# Patient Record
Sex: FEMALE | Race: ASIAN
[De-identification: names, ages, dates, MRNs, and addresses within clinical notes are randomized per-mention and may not be internally consistent; named-entity substitution may affect disease eponyms.]

---

## 2017-03-26 ENCOUNTER — HOSPITAL ENCOUNTER (EMERGENCY)
Dept: HOSPITAL 25 - UCEAST | Age: 22
Discharge: HOME | End: 2017-03-26
Payer: COMMERCIAL

## 2017-03-26 VITALS — DIASTOLIC BLOOD PRESSURE: 65 MMHG | SYSTOLIC BLOOD PRESSURE: 113 MMHG

## 2017-03-26 DIAGNOSIS — N39.0: Primary | ICD-10-CM

## 2017-03-26 PROCEDURE — 87086 URINE CULTURE/COLONY COUNT: CPT

## 2017-03-26 PROCEDURE — 87186 SC STD MICRODIL/AGAR DIL: CPT

## 2017-03-26 PROCEDURE — 84702 CHORIONIC GONADOTROPIN TEST: CPT

## 2017-03-26 PROCEDURE — 87077 CULTURE AEROBIC IDENTIFY: CPT

## 2017-03-26 PROCEDURE — G0463 HOSPITAL OUTPT CLINIC VISIT: HCPCS

## 2017-03-26 PROCEDURE — 99202 OFFICE O/P NEW SF 15 MIN: CPT

## 2017-03-26 PROCEDURE — 81003 URINALYSIS AUTO W/O SCOPE: CPT

## 2017-03-26 NOTE — UC
Complaint Female HPI





- HPI Summary


HPI Summary: 





FREQUENCY AND BURNING WITH URINATION SINCE THIS MORNING. NO BACK PAIN. NO 

FEVER. NO ABDOMINAL PAIN





- History Of Current Complaint


Chief Complaint: UCGU


Stated Complaint: POSS UTI


Time Seen by Provider: 03/26/17 15:58


Hx Obtained From: Patient


Hx Last Menstrual Period: 3/19/17


Onset/Duration: Sudden Onset, Lasting Hours, Still Present


Timing: Lasting Hours


Severity Initially: Moderate


Severity Currently: Moderate


Character: Burning


Aggravating Factor(s): Urination


Alleviating Factor(s): Position


Associated Signs And Symptoms: Negative: Fever, Back Pain, Vaginal Bleeding/

Discharge, Vaginal Discharge, Nausea, Vomiting(# Of Episodes =)





- Risk Factors


Ectopic Pregnancy Risk Factor: Negative


Ovarian Torsion Risk Factor: Negative





- Allergies/Home Medications


Allergies/Adverse Reactions: 


 Allergies











Allergy/AdvReac Type Severity Reaction Status Date / Time


 


No Known Allergies Allergy   Verified 03/17/14 10:57














PMH/Surg Hx/FS Hx/Imm Hx


Previously Healthy: Yes





- Surgical History


Surgical History: None





- Family History


Known Family History: 


   Negative: Renal Disease





- Social History


Occupation: Student


Lives: With Family


Alcohol Use: Occasionally


Substance Use Type: None


Smoking Status (MU): Never Smoked Tobacco





Review of Systems


Constitutional: Negative


Skin: Negative


Eyes: Negative


ENT: Negative


Respiratory: Negative


Cardiovascular: Negative


Gastrointestinal: Negative


Genitourinary: Dysuria, Frequency, Urgency


Motor: Negative


Neurovascular: Negative


Musculoskeletal: Negative


Neurological: Negative


Psychological: Negative


All Other Systems Reviewed And Are Negative: Yes





Physical Exam


Triage Information Reviewed: Yes


Appearance: Well-Appearing, No Pain Distress, Well-Nourished


Vital Signs: 


 Initial Vital Signs











Temp  98.4 F   03/26/17 16:06


 


Pulse  67   03/26/17 16:06


 


Resp  18   03/26/17 16:06


 


BP  113/65   03/26/17 16:06


 


Pulse Ox  100   03/26/17 16:06











Vital Signs Reviewed: Yes


Eye Exam: Normal


Eyes: Positive: Conjunctiva Clear


ENT Exam: Normal


ENT: Positive: Normal ENT inspection, Hearing grossly normal, Pharynx normal, 

TMs normal


Dental Exam: Normal


Neck exam: Normal


Neck: Positive: Supple, Nontender, No Lymphadenopathy


Respiratory Exam: Normal


Respiratory: Positive: Chest non-tender, Lungs clear, Normal breath sounds, No 

respiratory distress, No accessory muscle use


Cardiovascular Exam: Normal


Cardiovascular: Positive: RRR, No Murmur, Pulses Normal


Abdominal Exam: Normal


Abdomen Description: Positive: Nontender, No Organomegaly, Soft.  Negative: CVA 

Tenderness (R), CVA Tenderness (L)


Musculoskeletal Exam: Normal


Neurological Exam: Normal


Psychological Exam: Normal


Skin Exam: Normal





 Complaint Female Dx





- Differential Dx/Diagnosis


Differential Diagnosis/HQI/PQRI: Ovarian Cyst, Retained Foreign Body, Sexually 

Transmitted Disease, Urinary Tract Infection


Provider Diagnoses: URINARY TRACT INFECTION





Discharge





- Discharge Plan


Condition: Stable


Disposition: HOME


Prescriptions: 


Phenazopyridine TAB* [Pyridium 100 mg TAB*] 100 mg PO TID #15 tab


Sulfamethox/Trimethoprim DS* [Bactrim /160 TAB*] 1 tab PO BID #10 tab


Patient Education Materials:  Urinary Tract Infection in Women (ED)


Referrals: 


Zain Ramírez MD [Primary Care Provider] -

## 2017-05-12 NOTE — UC
Complaint Female HPI





- HPI Summary


HPI Summary: 


5 DAYS OF URINARY FREQUENCY AND DYSURIA. NO FEVER, NAUSEA OR BACK PAIN.  





- History Of Current Complaint


Chief Complaint: UCGU


Stated Complaint: UTI


Time Seen by Provider: 05/12/17 07:59


Hx Obtained From: Patient


Hx Last Menstrual Period: 4/20/17


Onset/Duration: Gradual Onset, Lasting Days, Still Present


Timing: Constant


Severity Initially: Mild


Severity Currently: Mild


Pain Intensity: 2


Pain Scale Used: 0-10 Numeric


Character: Burning


Aggravating Factor(s): Urination


Alleviating Factor(s): Nothing


Associated Signs And Symptoms: Positive: Negative





- Allergies/Home Medications


Allergies/Adverse Reactions: 


 Allergies











Allergy/AdvReac Type Severity Reaction Status Date / Time


 


No Known Allergies Allergy   Verified 03/17/14 10:57














PMH/Surg Hx/FS Hx/Imm Hx


Previously Healthy: Yes


Endocrine History Of: 


   Denies: Diabetes, Thyroid Disease


Cardiovascular History Of: 


   Denies: Cardiac Disorders, Hypertension


Respiratory History Of: 


   Denies: COPD, Asthma


GI/ History Of: 


   Denies: Ulcer





- Surgical History


Surgical History: None





- Family History


Known Family History: 


   Negative: Hypertension, Renal Disease





- Social History


Alcohol Use: Occasionally


Substance Use Type: None


Smoking Status (MU): Never Smoked Tobacco





Review of Systems


Constitutional: Negative


Respiratory: Negative


Cardiovascular: Negative


Gastrointestinal: Negative


Genitourinary: Dysuria, Frequency


All Other Systems Reviewed And Are Negative: Yes





Physical Exam


Triage Information Reviewed: Yes


Appearance: Well-Appearing, No Pain Distress, Well-Nourished


Vital Signs: 


 Initial Vital Signs











Temp  98.3 F   05/12/17 08:22


 


Pulse  64   05/12/17 08:22


 


Resp  18   05/12/17 08:22


 


BP  108/75   05/12/17 08:22


 


Pulse Ox  100   05/12/17 08:22











Vital Signs Reviewed: Yes


Eyes: Positive: Conjunctiva Clear


ENT: Positive: Hearing grossly normal


Respiratory: Positive: No respiratory distress, No accessory muscle use


Cardiovascular: Positive: Pulses Normal


Abdomen Description: Positive: Soft, Other: - SUPRAPUBIC TTP.  Negative: CVA 

Tenderness (R), CVA Tenderness (L), Distended, Guarding


Musculoskeletal: Positive: No Edema


Neurological: Positive: Alert


Psychological: Positive: Age Appropriate Behavior


Skin: Negative: rashes





Diagnostics





- Laboratory


Diagnostic Studies Completed/Ordered: URINE TEST SP. GR. 1.030, 1+ LEUKS, 2+ 

BLOOD, 2+ PROTEIN.  URINE HCG NEG





 Complaint Female Dx





- Differential Dx/Diagnosis


Provider Diagnoses: UTI





Discharge





- Discharge Plan


Condition: Stable


Disposition: HOME


Prescriptions: 


Phenazopyridine TAB* [Pyridium TAB*] 200 mg PO TID #6 tab


Sulfamethox/Trimethoprim DS* [Bactrim /160 TAB*] 1 tab PO BID #10 tab


Patient Education Materials:  Urinary Tract Infection in Women (ED)


Referrals: 


Zain Ramírez MD [Primary Care Provider] - If Needed

## 2017-10-11 NOTE — UC
Back Pain HPI





- HPI Summary


HPI Summary: 


1 WEEK OF LOWER BACK PAIN. WHEN SEATED IT RADIATES DOWN HER LEGS. NO NUMBNESS, 

TINGLING OR SADDLE ANESTHESIA. DOES A LOT OF HEAVY LIFTING AT WORK. IBUPROFEN 

NOT HELPFUL. WORSE WITH MOVEMENT.





- History of Current Complaint


Chief Complaint: UCBackPain


Stated Complaint: BACK PAIN


Time Seen by Provider: 10/11/17 09:22


Hx Obtained From: Patient


Hx Last Menstrual Period: 9/27/17


Onset/Duration: Sudden Onset, Lasting Weeks, Still Present


Timing: Constant


Severity Initially: Moderate


Severity Currently: Moderate


Pain Intensity: 6


Pain Scale Used: 0-10 Numeric


Back Pain: Is Discrete @ - LOWER BACK


Character: Aching


Aggravating Factor(s): Movement


Alleviating Factor(s): Rest


Associated Signs And Symptoms: Positive: Negative





- Allergies/Home Medications


Allergies/Adverse Reactions: 


 Allergies











Allergy/AdvReac Type Severity Reaction Status Date / Time


 


No Known Allergies Allergy   Verified 10/11/17 09:12











Home Medications: 


 Home Medications





Oral Birth Control  10/11/17 [History]











PMH/Surg Hx/FS Hx/Imm Hx


Previously Healthy: Yes





- Surgical History


Surgical History: None





- Family History


Known Family History: 


   Negative: Hypertension, Renal Disease





- Social History


Alcohol Use: Occasionally


Substance Use Type: None


Smoking Status (MU): Never Smoked Tobacco





Review of Systems


Constitutional: Negative


Respiratory: Negative


Cardiovascular: Negative


Gastrointestinal: Negative


Musculoskeletal: Decreased ROM, Myalgia


All Other Systems Reviewed And Are Negative: Yes





Physical Exam


Triage Information Reviewed: Yes


Appearance: Well-Appearing, No Pain Distress, Well-Nourished


Vital Signs: 


 Initial Vital Signs











Temp  98.8 F   10/11/17 09:13


 


Pulse  68   10/11/17 09:13


 


Resp  16   10/11/17 09:13


 


BP  121/68   10/11/17 09:13


 


Pulse Ox  100   10/11/17 09:13











Vital Signs Reviewed: Yes


Eyes: Positive: Conjunctiva Clear


ENT: Positive: Hearing grossly normal


Neck: Positive: Supple


Respiratory: Positive: No respiratory distress, No accessory muscle use


Cardiovascular: Positive: Pulses Normal


Abdomen Description: Positive: Soft


Musculoskeletal: Positive: No Edema, ROM Limited @ - BACK, Other: - NEG 

STRAIGHT LEG RAISE


Neurological: Positive: Alert


Psychological: Positive: Age Appropriate Behavior


Skin: Negative: rashes





Back Pain Course/Dx





- Differential Dx/Diagnosis


Provider Diagnoses: ACUTE LOW BACK PAIN





Discharge





- Discharge Plan


Condition: Stable


Disposition: HOME


Prescriptions: 


Cyclobenzaprine TAB* [Flexeril TAB*] 10 mg PO BID PRN #30 tab


 PRN Reason: Pain


Naproxen [Naproxen EC] 500 mg PO BID PRN #30 tab


 PRN Reason: Pain


Patient Education Materials:  Acute Low Back Pain (ED)


Referrals: 


Zain Ramírez MD [Primary Care Provider] - If Needed


Additional Instructions: 


BE SURE TO GO THROUGH SLOW RANGE OF MOTION AND STRETCHING EXERCISES DAILY AS 

YOU ARE ABLE TO PREVENT STIFFENING UP AND MAKING THE DISCOMFORT WORSE. SEEK 

FOLLOW-UP WITH YOUR PCP IF YOU ARE NOT IMPROVING OVER THE NEXT FEW WEEKS.

## 2018-01-14 NOTE — ED
Juany LUCIO Abhishek, scribed for Rajeev Balderas MD on 01/14/18 at 1509 .





Abdominal Pain/Female





- HPI Summary


HPI Summary: 


This patient is a 22 year [facility] F [facility] with a 

chief complaint of lower abd pain since 1/11/18. The Pt states she has had 

previous episodes of intermittent abd pain similar to current episode, however, 

this pain is described as a constant pains compared to the intermittent prior 

episodes. Pt later states that despite the constant pain, there are moments 

where the pain waxes and wanes. The patient rates the pain 4/10 in severity 

currently. Symptoms aggravated by nothing. Symptoms alleviated by nothing. 

Patient reports lower abd pain radiates to the back right side, and nausea. 

LMNP was before last week.








- History of Current Complaint


Chief Complaint: EDAbdPain


Stated Complaint: ABD PAIN


Time Seen by Provider: 01/14/18 12:36


Hx Obtained From: Patient


Hx Last Menstrual Period: Reported to before last week (prior to 1/7/18)


Pregnant?: No


Onset/Duration: Lasting Days - since 1/11/18


Timing: Constant - since 1/11/18


Severity Initially: Moderate


Severity Currently: Moderate


Pain Intensity: 4


Location: Other - Lower abd pain


Radiates: Yes


Radiates to: Back - right side


Aggravating Factor(s): Nothing


Alleviating Factor(s): Nothing


Associated Signs and Symptoms: Positive: Back Pain - right side, Nausea


Allergies/Adverse Reactions: 


 Allergies











Allergy/AdvReac Type Severity Reaction Status Date / Time


 


No Known Allergies Allergy   Verified 01/14/18 12:25














PMH/Surg Hx/FS Hx/Imm Hx


Endocrine/Hematology History: 


   Denies: Hx Diabetes, Hx Thyroid Disease


Cardiovascular History: 


   Denies: Hx Hypertension


Respiratory History: 


   Denies: Hx Asthma, Hx Chronic Obstructive Pulmonary Disease (COPD)


GI History: 


   Denies: Hx Ulcer


Infectious Disease History: No


Infectious Disease History: 


   Denies: Hx Clostridium Difficile, Hx Hepatitis, Hx Human Immunodeficiency 

Virus (HIV), Hx of Known/Suspected MRSA, Hx Shingles, Hx Tuberculosis, Hx Known/

Suspected VRE, Hx Known/Suspected VRSA, History Other Infectious Disease, 

Traveled Outside the [place] in Last 30 Days





- Family History


Known Family History: 


   Negative: Hypertension, Renal Disease





- Social History


Alcohol Use: Occasionally


Alcohol Amount: couple times every 2 weeks


Substance Use Type: Reports: None


Smoking Status (MU): Never Smoked Tobacco





Review of Systems


Constitutional: Negative


Eyes: Negative


ENT: Negative


Cardiovascular: Negative


Respiratory: Negative


Positive: Abdominal Pain - lower abd, Nausea


Genitourinary: Negative


Musculoskeletal: Other - back pain (right side)


Skin: Negative


Neurological: Negative


Psychological: Normal


All Other Systems Reviewed And Are Negative: Yes





Physical Exam





- Summary


Physical Exam Summary: 





Appearance: The patient is well-nourished in no acute distress and in no acute 

pain.


 


Skin: The skin is warm and dry and skin color reflects adequate perfusion.


 





HEENT: ~The head is normocephalic and atraumatic. The pupils are equal and 

reactive. The conjunctivae are clear and without drainage. ~Nares are patent 

and without drainage. ~Mouth reveals moist mucous membranes and the throat is 

without erythema and exudate. ~The external ears are intact. The ear canals are 

patent and without drainage. The tympanic membranes are intact.


 


Neck: the neck is supple with full range of motion and non-tender. There are no 

carotid bruits. ~There is no neck vein distension.


 


Respiratory: Chest is non-tender. ~Lungs are clear to auscultation and breath 

sounds are symmetrical and equal.


 


Cardiovascular: Heart is regular rate and rhythm. ~There is no murmur or rub 

auscultated. ~~There is no peripheral edema and pulses are symmetrical and 

equal.


 


Abdomen: Tender in epigastrium, mildly tender in the right upper quadrant and 

Negative Allen signs 





 


Musculoskeletal: There is no back tenderness noted. ~Extremities are non-tender 

with full range of motion. ~There is good capillary refill. ~There is no 

peripheral edema or calf tenderness elicited.


 


Neurological: Patient is alert and oriented to person, place and time. ~The 

patient has symmetrical motor strength in all four extremities. ~Cranial nerves 

are grossly intact. Deep tendon reflexes are symmetrical and equal in all four 

extremities.


 


Psychiatric: The patient has an appropriate affect and does not exhibit any 

anxiety or depression.





Triage Information Reviewed: Yes


Vital Signs On Initial Exam: 


 Initial Vitals











Temp Pulse Resp BP Pulse Ox


 


 98.2 F   70   16   122/66   100 


 


 01/14/18 12:25  01/14/18 12:25  01/14/18 12:25  01/14/18 12:25  01/14/18 12:25











Vital Signs Reviewed: Yes





Diagnostics





- Vital Signs


 Vital Signs











  Temp Pulse Resp BP Pulse Ox


 


 01/14/18 12:43   68    99


 


 01/14/18 12:42     114/78 


 


 01/14/18 12:25  98.2 F  70  16  122/66  100














- Laboratory


Lab Results: 


 Lab Results











  01/14/18 01/14/18 01/14/18 Range/Units





  13:32 13:32 13:32 


 


WBC   7.4   (3.5-10.8)  10^3/ul


 


RBC   5.24   (4.0-5.4)  10^6/ul


 


Hgb   11.5 L   (12.0-16.0)  g/dl


 


Hct   35   (35-47)  %


 


MCV   67 L   (80-97)  fL


 


MCH   22 L   (27-31)  pg


 


MCHC   33   (31-36)  g/dl


 


RDW   21 H   (10.5-15)  %


 


Plt Count   324   (150-450)  10^3/ul


 


MPV   7 L   (7.4-10.4)  um3


 


Neut % (Auto)   65.3   (38-83)  %


 


Lymph % (Auto)   27.4   (25-47)  %


 


Mono % (Auto)   6.2   (1-9)  %


 


Eos % (Auto)   0.6   (0-6)  %


 


Baso % (Auto)   0.5   (0-2)  %


 


Absolute Neuts (auto)   4.9   (1.5-7.7)  10^3/ul


 


Absolute Lymphs (auto)   2.0   (1.0-4.8)  10^3/ul


 


Absolute Monos (auto)   0.5   (0-0.8)  10^3/ul


 


Absolute Eos (auto)   0   (0-0.6)  10^3/ul


 


Absolute Basos (auto)   0   (0-0.2)  10^3/ul


 


Absolute Nucleated RBC   0   10^3/ul


 


Nucleated RBC %   0.1   


 


Hypochromasia   1+   


 


Anisocytosis   1+   


 


Microcytosis   1+   


 


Sodium  136    (133-145)  mmol/L


 


Potassium  3.7    (3.5-5.0)  mmol/L


 


Chloride  104    (101-111)  mmol/L


 


Carbon Dioxide  25    (22-32)  mmol/L


 


Anion Gap  7    (2-11)  mmol/L


 


BUN  9    (6-24)  mg/dL


 


Creatinine  0.63    (0.51-0.95)  mg/dL


 


Est GFR ( Amer)  152.0    (>60)  


 


Est GFR (Non-Af Amer)  118.2    (>60)  


 


BUN/Creatinine Ratio  14.3    (8-20)  


 


Glucose  80    ()  mg/dL


 


Lactic Acid    0.7  (0.5-2.0)  mmol/L


 


Calcium  9.3    (8.6-10.3)  mg/dL


 


Total Bilirubin  0.60    (0.2-1.0)  mg/dL


 


AST  14    (13-39)  U/L


 


ALT  7    (7-52)  U/L


 


Alkaline Phosphatase  30 L    ()  U/L


 


C-Reactive Protein  21.58 H    (< 5.00)  mg/L


 


Total Protein  7.2    (6.4-8.9)  g/dL


 


Albumin  4.0    (3.2-5.2)  g/dL


 


Globulin  3.2    (2-4)  g/dL


 


Albumin/Globulin Ratio  1.3    (1-3)  


 


Lipase  17    (11.0-82.0)  U/L


 


Beta HCG, Quant  < 0.60    mIU/mL


 


Urine Color     


 


Urine Appearance     


 


Urine pH     (5-9)  


 


Ur Specific Gravity     (1.010-1.030)  


 


Urine Protein     (Negative)  


 


Urine Ketones     (Negative)  


 


Urine Blood     (Negative)  


 


Urine Nitrate     (Negative)  


 


Urine Bilirubin     (Negative)  


 


Urine Urobilinogen     (Negative)  


 


Ur Leukocyte Esterase     (Negative)  


 


Urine WBC (Auto)     (Absent)  


 


Urine RBC (Auto)     (Absent)  


 


Ur Squamous Epith Cells     (Absent)  


 


Urine Bacteria     (Absent)  


 


Urine Glucose     (Negative)  














  01/14/18 Range/Units





  13:32 


 


WBC   (3.5-10.8)  10^3/ul


 


RBC   (4.0-5.4)  10^6/ul


 


Hgb   (12.0-16.0)  g/dl


 


Hct   (35-47)  %


 


MCV   (80-97)  fL


 


MCH   (27-31)  pg


 


MCHC   (31-36)  g/dl


 


RDW   (10.5-15)  %


 


Plt Count   (150-450)  10^3/ul


 


MPV   (7.4-10.4)  um3


 


Neut % (Auto)   (38-83)  %


 


Lymph % (Auto)   (25-47)  %


 


Mono % (Auto)   (1-9)  %


 


Eos % (Auto)   (0-6)  %


 


Baso % (Auto)   (0-2)  %


 


Absolute Neuts (auto)   (1.5-7.7)  10^3/ul


 


Absolute Lymphs (auto)   (1.0-4.8)  10^3/ul


 


Absolute Monos (auto)   (0-0.8)  10^3/ul


 


Absolute Eos (auto)   (0-0.6)  10^3/ul


 


Absolute Basos (auto)   (0-0.2)  10^3/ul


 


Absolute Nucleated RBC   10^3/ul


 


Nucleated RBC %   


 


Hypochromasia   


 


Anisocytosis   


 


Microcytosis   


 


Sodium   (133-145)  mmol/L


 


Potassium   (3.5-5.0)  mmol/L


 


Chloride   (101-111)  mmol/L


 


Carbon Dioxide   (22-32)  mmol/L


 


Anion Gap   (2-11)  mmol/L


 


BUN   (6-24)  mg/dL


 


Creatinine   (0.51-0.95)  mg/dL


 


Est GFR ( Amer)   (>60)  


 


Est GFR (Non-Af Amer)   (>60)  


 


BUN/Creatinine Ratio   (8-20)  


 


Glucose   ()  mg/dL


 


Lactic Acid   (0.5-2.0)  mmol/L


 


Calcium   (8.6-10.3)  mg/dL


 


Total Bilirubin   (0.2-1.0)  mg/dL


 


AST   (13-39)  U/L


 


ALT   (7-52)  U/L


 


Alkaline Phosphatase   ()  U/L


 


C-Reactive Protein   (< 5.00)  mg/L


 


Total Protein   (6.4-8.9)  g/dL


 


Albumin   (3.2-5.2)  g/dL


 


Globulin   (2-4)  g/dL


 


Albumin/Globulin Ratio   (1-3)  


 


Lipase   (11.0-82.0)  U/L


 


Beta HCG, Quant   mIU/mL


 


Urine Color  Yellow  


 


Urine Appearance  Clear  


 


Urine pH  6.0  (5-9)  


 


Ur Specific Gravity  1.013  (1.010-1.030)  


 


Urine Protein  Negative  (Negative)  


 


Urine Ketones  Negative  (Negative)  


 


Urine Blood  1+ H  (Negative)  


 


Urine Nitrate  Negative  (Negative)  


 


Urine Bilirubin  Negative  (Negative)  


 


Urine Urobilinogen  Negative  (Negative)  


 


Ur Leukocyte Esterase  Negative  (Negative)  


 


Urine WBC (Auto)  Trace(0-5/hpf)  (Absent)  


 


Urine RBC (Auto)  Trace(0-2/hpf)  (Absent)  


 


Ur Squamous Epith Cells  Present H  (Absent)  


 


Urine Bacteria  Absent  (Absent)  


 


Urine Glucose  Negative  (Negative)  











Result Diagrams: 


 01/14/18 13:32





 01/14/18 13:32


Lab Statement: Any lab studies that have been ordered have been reviewed, and 

results considered in the medical decision making process.





Abdominal Pain Fem Course/Dx





- Course


Course Of Treatment: Ms. Portillo presented with epigastric and RUQ pain 

with no exacerbation and relieving factors. She has had it intermittently for a 

long time but for the last couple of days, it has been constant.  It is 

described as sharp.  She got significant relief from GI meds here although not 

complete.  Her W/U was normal.  I will start her on prilosec and encourage F/U 

this coming week.





- Diagnoses


Provider Diagnoses: 


 Epigastric pain








Discharge





- Discharge Plan


Condition: Stable


Disposition: HOME


Prescriptions: 


Omeprazole CAP* [Prilosec CAP* 20 MG] 20 mg PO BID #20 cap.


Patient Education Materials:  Epigastric Pain (ED)


Referrals: 


Zain Ramírez MD [Primary Care Provider] -  (Follow up with PCP within the 

week.)


Additional Instructions: 


RETURN TO THE EMERGENCY DEPARTMENT FOR CHANGING OR WORSENING SYMPTOMS.





The documentation as recorded by the Juany juárez Abhishek accurately reflects 

the service I personally performed and the decisions made by me, Rajeev Balderas MD.

## 2018-01-14 NOTE — UC
Abdominal Pain Female HPI





- HPI Summary


HPI Summary: 





Patient presents with an unremarkable past medical history. She presents with 

complaints of abdominal pain for 4 months, she states it is in the epigastrium 

and radiates up the abdomen and at times and around to her back. She reports 

associated nausea. She denies fever, chills,  chest pain, dyspnea, diarrhea, 

dysuria, abnormal vaginal discharge, or bleeding. She cannot states any factors 

that make the pain better or worse. She states that in the last 4 days the pain 

and gotten worse, and has become more persistent. 





- History of Current Complaint


Chief Complaint: UCAbdominalPain


Stated Complaint: ABDOMINAL PAIN


Time Seen by Provider: 01/14/18 11:40


Hx Obtained From: Patient


Hx Last Menstrual Period: t-14


Pregnant?: No


Onset/Duration: Sudden Onset


Timing: Intermittent Episodes Lasting:


Severity Initially: Mild


Severity Currently: Moderate


Location: Discrete At: RUQ, Epigastric


Radiates to: Back


Character: Cramping


Aggravating Factor(s): Nothing


Alleviating Factor(s): Nothing


Associated Signs and Symptoms: Positive: Negative





- Risk Factors


Ectopic Pregnancy Risk Factor: Negative


Allergies/Adverse Reactions: 


 Allergies











Allergy/AdvReac Type Severity Reaction Status Date / Time


 


No Known Allergies Allergy   Verified 10/11/17 09:12














PMH/Surg Hx/FS Hx/Imm Hx


Previously Healthy: Yes





- Surgical History


Surgical History: None





- Family History


Known Family History: 


   Negative: Hypertension, Renal Disease





- Social History


Occupation: Employed Full-time


Lives: Alone


Alcohol Use: Occasionally


Substance Use Type: None


Smoking Status (MU): Never Smoked Tobacco





Review of Systems


Constitutional: Negative


Skin: Negative


Eyes: Negative


ENT: Negative


Respiratory: Negative


Cardiovascular: Negative


Gastrointestinal: Abdominal Pain, Nausea


Genitourinary: Negative


Motor: Negative


Neurovascular: Negative


Musculoskeletal: Negative


Neurological: Negative


Psychological: Negative


Is Patient Immunocompromised?: No


All Other Systems Reviewed And Are Negative: Yes





Physical Exam


Triage Information Reviewed: Yes


Appearance: Well-Appearing


Vital Signs: 


 Initial Vital Signs











Temp  97.9 F   01/14/18 11:12


 


Pulse  77   01/14/18 11:12


 


Resp  14   01/14/18 11:12


 


BP  122/72   01/14/18 11:12


 


Pulse Ox  100   01/14/18 11:12











Vital Signs Reviewed: Yes


Eye Exam: Normal


ENT Exam: Normal


Neck exam: Normal


Neck: Positive: 1


Respiratory Exam: Normal


Cardiovascular Exam: Normal


Abdominal Exam: Normal


Abdomen Description: Positive: No Organomegaly, Soft, Other: - mcmurphy's sign 

positive.


Skin Exam: Normal





Abd Pain Female Course/Dx





- Course


Course Of Treatment: Patient presents with complaints of abdominal pain x 4 

months with worsening symtpoms the last 4 days and associated nausea. She had 

normal vital signs, with nontoxic appearance, and I felt she needed a through 

work up that could not be obtained in a timely manner at this facility and 

therefore was discharged and told to go directly to the ER. She declined 

ambulance transport to the ER. At the time of discharge the patient was 

hemodynamically stable.





- Differential Dx/Diagnosis


Differential Diagnosis: Other - abdominal pain


Provider Diagnoses: abdominal pain





Discharge





- Discharge Plan


Condition: Stable


Disposition: HOME


Patient Education Materials:  Abdominal Pain (ED)


Referrals: 


No Primary Care Phys,NOPCP [Primary Care Provider] -

## 2018-01-14 NOTE — XMS REPORT
Catherine Portillo

 Created on:2018



Patient:Catherine Portillo

Sex:Female

:1995

External Reference #:[id number]





Demographics







 Address  [address]



   [place] [address]

 

 Home Phone  [phone]

 

 Preferred Language  English

 

 Marital Status  Not  Or 

 

 Roman [employer] Affiliation  Unknown

 

 Race  

 

 Ethnic Group  Not  Or 









Author







 Organization  [facility]

 

 Address  [address] [place] [address]

 

 Phone  [phone]









Support







 Name  Relationship  Address  Phone

 

 Hudson Jarrell  Unrelated Friend  Unavailable  [phone]









Care Team Providers







 Name  Role  Phone

 

 Zain Ramírez M.D.  Primary Care Physician  Unavailable









Payers







 Type  Date  Identification Numbers  Payment Provider  Subscriber

 

 Commercial    Policy Number:  [employer]  Panisara Chutintaranond



     [id number]  Plan  









 PayID: [id number]  [address] [id number]









 [place] [address]







Problems







 Date  Description  Provider  Status

 

 Onset: 2015  Myopia  Hope Garcia O.D.  Active







Family History







 Date  Family Member(s)  Problem(s)  Comments

 

   Father  No Current Problems  

 

   Mother  No Current Problems  







Social History







 Type  Date  Description  Comments

 

 Marital Status    Single  

 

 Occupation    /  full time

 

 ETOH Use    Denies alcohol use  

 

 Smoking    Patient has never smoked  

 

 Recreational Drug Use    Denies Drug Use  

 

 Daily Caffeine    Does Not Consume Caffeine  







Allergies, Adverse Reactions, Alerts







 Date  Description  Reaction  Status  Severity  Comments

 

 2015  NKDA    active    







Medications







 Medication  Date  Status  Form  Strength  Qnty  SIG  Indications  Ordering



                 Provider

 

 Ortho Tri-Cyclen    Active  Tablets  0.18/0.215/0        Unknown



 Lo  0      .25 mg-25        



         mcg        

 

                 

 

 No Active    Hx            Unknown



 Medications  5 -              



                 



   5              







Results







 Description

 

 No Information







Procedures







 Date  CPT Code  Description  Status

 

 2017  22988  Determination Of Refractive State  Completed

 

 2017  23043  Est Patient Comprehensive Exam  Completed

 

 2015  13954  Determination Of Refractive State  Completed

 

 2015  26823  Est Patient Comprehensive Exam  Completed

 

 2014  03509  Determination Of Refractive State  Completed

 

 2014  99768  New Patient Comprehensive Exam  Completed

 

 2014  101  Level 1 SCL Fit/Refit  Completed







Plan of Care

2017 - Precious Vogel O.D.H52.13 Myopia, bilateralComments:Smoking 
can increase the risk of developing or worsening any eye related disease, as 
well as affect your overall health.  If you are a smoker, we strongly recommend 
that you quit.If you are not a smoker, we strongly recommend that you do not 
start.  You have Myopia, or near sightedness.  I have given you a prescription 
for glasses.Follow up:1 Year Follow Up

## 2018-01-14 NOTE — XMS REPORT
Catherine Arreola

 Created on:2018



Patient:Catherine Arreola

Sex:Female

:1995

External Reference #:[id number]





Demographics







 Address  [address] [place] [id number]

 

 Home Phone  [phone]

 

 Email Address  [email or web address]

 

 Preferred Language  English

 

 Marital Status  Not  Or 

 

 Quaker Affiliation  Unknown

 

 Race  

 

 Ethnic Group  Not  Or 









Author







 Organization  [facility]

 

 Address  [address] [place] [address]

 

 Phone  [phone]









Support







 Name  Relationship  Address  Phone

 

 Hieu Caiolas  Unavailable  Unavailable  [phone]









Care Team Providers







 Name  Role  Phone

 

 Zain Ramírez MD  Care Team Information   Unavailable

 

 Zain Ramírez MD  Primary Care Physician  Unavailable









Payers







 Type  Date  Identification  Payment  Subscriber



     Numbers  Provider  

 

 Health Maintenance  Effective:  Policy Number:  Essential Plan  [employer]



 Organization (7write)  2017  [id number]  Izabella Arreola









 Group Number: [id number]  [address] [address]

 

 PayID: [id number]  [place] [address]







Problems







 Description

 

 No Information







Family History







 Date  Family Member(s)  Problem(s)  Comments

 

   Father  51.  healthy.  

 

   Mother  49, healthy.  

 

   First Brother  healthy.  







Social History







 Type  Date  Description  Comments

 

 Education    Highest level of education  



     completed is 12th grade.  



     studying at [employer] second year.  



      Studying accounting.  

 

 Living Situation    Patient lives alone  

 

 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  



     time.  

 

 Cigarette Use    Never Smoked Cigarettes  

 

 ETOH Use    Social Alcohol  3 drinks a month.

 

 Recreational Drug Use    Never Used Drugs  

 

 Exercise Type/Frequency Current    Does not exercise  

 

 Seat Belt/Car Seat    Always uses a seat belt  

 

 Bike Helmet    Patient always wears a bike  



     helmet  

 

 Currently Active    The patient is currently  



     sexually active  







Allergies, Adverse Reactions, Alerts







 Date  Description  Reaction  Status  Severity  Comments

 

 2012  NKDA    active    







Medications







 Medication  Date  Status  Form  Strength  Qnty  SIG  Indications  Ordering



                 Provider

 

 Cyclobenzaprine    Active  Tablets  10mg  10tab  take one by  G44.209  
Yasmine C.



 HCL  /        s  mouth at    Eric,



             bedtime prn    NP



             pain    

 

 Sumatriptan    Active  Tablets  50mg  5tabs  Take one by  G44.209  Yasmine 
C.



 Succinate  /          mouth at    Eric,



             first sign    NP



             of headache.    



             Do not take    



             more than 3    



             consecutive    



             days in a    



             row.    

 

 Ortho Tri-Cyclen    Active  Tablets  0.18/0.21  3pack  take as    
Paz



 (      5/0.25    directed    Juarez,



         mg-35 mcg        FN

 

                 

 

 Ergocalciferol    Hx  Capsules  [id number]  8caps  1 by mouth              weekly x 8    Juarez,



   -          weeks    FNP



   10/06              



   /2016              

 

 Sertraline HCL    Hx  Tablets  25mg  30tab  1 by mouth  F43.21  Paz        s  every day    Juarez,



   -              FNP



                 

 

 Escitalopram  03/10  Hx  Tablets  5mg  30tab  1 by mouth  F43.21  Adore Woods          s  every day    Chantell Talbert M.D.



                 

 

 Amoxicillin/Clav  09/10  Hx  Tablets  875-125mg  20tab  1 po bid x  461.8  
Mana



 ulanate  /        s  10 days    Brown, NP



 Potassium  -              



                 

 

 No Active    Hx            Unknown



 Medications  /              



   -              



   09/10              



   /2014              

 

 Zithromax    Hx  Tablets  250mg  6Tabs  2 po qd    Aurelio T.



   /          today , then    Midura,



   -          1 po qd    M.D.



             times               

 

 Zithromax    Hx    200mg/5cc  30ml  1 1/2 TSP    Grisel



 Suspension  /          Day One Then    Manuel,



 200MG/5cc  -          3/4 TSP    FNP



             Daily    



   /          Over  Next 4    



             Days.    

 

 Albuterol    Hx      1unit  2 puffs    Grisel



 Inhaler  /        s  q3-4H prn    Manuel,



   -              FNP



                 

 

 Aerochamber    Hx      1unit  Use as    Grisel



   /        s  Directed    Manuel,



   -              FNP



                 

 

 Hydrocortisone    Hx    1%  30gm  Apply bid    Refugio NIÑO



   /          prn    Chantell Ascencio              M.DCurtis



                 To Rash    



   /2007              

 

 Zithromax    Hx    200mg/5cc  15ml  3/4 TSP    Aurelio T.



 Suspension  /          Day1, Then    Midura,



 200MG/5cc  -          1/2 TSP Day    M.D.



             2-              

 

 Hydrocortisone    Hx    2.5%  30gm  Apply bid    Aurelio T.



 Cream            prn To    Midura,



   -          Lesions    M.D.



                 

 

 Poly VI Fluoride    Hx    0.5mg Tab  100un  One Tab    Refugio Curtis



           its  Chewed Daily    Blumkin,



   -              M.D.



                 

 

 Sodium Sulamyd    Hx  Sol    10ml  1-2 gtts    .



 10% Opth Sol  /          Into Both    Blumkin,



   -          Eyes qid    M.D.



             Until Clear    



   /2000              

 

 Poly VI Fluoride    Hx    0.25mg  50cc  One Dropper    Refugio M.



             qd    Blumkin,



   -              M.D.



                 

 

 Ortho Tri-Cyclen    Hx  Tablets  0.18/0.21  28tab  take as    Unknown



   /0000      5/0.25  s  directed    



   -      mg-35 mcg        



                 







Immunizations







 CPT Code  Status  Date  Vaccine  Lot #

 

 [id number]  Given  2013  DO Not Use Split Influenza Virus Vaccine  [id number]

 

 97142  Given  10/23/2012  DO Not Use Split Influenza Virus Vaccine  [id number]

 

 10068  Given  2010  Gardasil  vacine typs 6,11,16,18 3 dose  [id number]



       schedule  

 

 36117  Given  2010  Gardasil  vacine typs 6,11,16,18 3 dose  [id number]



       schedule  

 

 24958  Given  2009  Gardasil  vacine typs 6,11,16,18 3 dose  [id number]



       schedule  

 

 90148  Given  2007  Tdap Tetanus, W Pertussis  [id number]

 

 92394  Given  2007  Hep A Ped 2-Dose Immunization  [id number]

 

 66615  Given  2006  Hep A Ped 2-Dose Immunization  [id number]

 

 18447  Given  2000  (IPV) Inactive Poliovirus Vaccine  

 

 77124  Given  2000  MMR Virus Immunization  

 

 05620  Given  1999  Oral Poliovirus Immunization  

 

 74352  Given  1999  MMR Virus Immunization  

 

 02444  Given  1997  DTP Immunization  

 

 89645  Given  1997  Oral Poliovirus Immunization  

 

 61386  Given  1997  Hemopholus Influenza B Immunization  

 

 31885  Given  1996  MMR Virus Immunization  

 

 62679  Given  1996  Measles Immunization  

 

 69546  Given  1996  Hepatitis B Immunization, [place]-19 Years  

 

 80240  Given  1996  Hemopholus Influenza B Immunization  

 

 80112  Given  1996  Oral Poliovirus Immunization  

 

 06777  Given  1995  Hemopholus Influenza B Immunization  

 

 18803  Given  1995  Oral Poliovirus Immunization  

 

 14460  Given  1995  DTP Immunization  

 

 81850  Given  1995  Hemopholus Influenza B Immunization  

 

 01320  Given  1995  Oral Poliovirus Immunization  

 

 95623  Given  1995  DTP Immunization  

 

 24049  Given  1995  Hepatitis B Immunization, [place]-19 Years  

 

 10102  Given  1995  Hepatitis B Immunization, [place]-19 Years  







Vital Signs







 Date  Vital  Result  Comment

 

 2018  BP Systolic  118 mmHg  









 BP Diastolic  60 mmHg  

 

 Heart Rate  76 /min  

 

 Body Temperature  98.7 F  

 

 Respiratory Rate  16 /min  

 

 Height  59.5 inches  4'11.50"

 

 Weight  129.25 lb  

 

 BMI (Body Mass Index)  25.7 kg/m2  









 10/06/2016  BP Systolic  112 mmHg  









 BP Diastolic  64 mmHg  

 

 Heart Rate  64 /min  

 

 Body Temperature  97.8 F  

 

 Respiratory Rate  16 /min  

 

 Height  59.5 inches  4'11.50"

 

 Weight  129.50 lb  

 

 BMI (Body Mass Index)  25.7 kg/m2  









 2016  BP Systolic  110 mmHg  









 BP Diastolic  68 mmHg  

 

 Heart Rate  80 /min  

 

 Body Temperature  98.7 F  

 

 Respiratory Rate  16 /min  

 

 Height  59.5 inches  4'11.50"

 

 Weight  122.12 lb  

 

 BMI (Body Mass Index)  24.3 kg/m2  









 2016  BP Systolic  118 mmHg  









 BP Diastolic  60 mmHg  

 

 Heart Rate  68 /min  

 

 Body Temperature  98.2 F  

 

 Respiratory Rate  16 /min  

 

 Height  59.5 inches  4'11.50"

 

 Weight  119.00 lb  

 

 BMI (Body Mass Index)  23.6 kg/m2  









 2016  BP Systolic  108 mmHg  









 BP Diastolic  70 mmHg  

 

 Heart Rate  64 /min  

 

 Body Temperature  96.8 F  

 

 Respiratory Rate  16 /min  

 

 Height  59.5 inches  4'11.50"

 

 Weight  119.00 lb  

 

 BMI (Body Mass Index)  23.6 kg/m2  









 03/10/2016  BP Systolic  110 mmHg  









 BP Diastolic  64 mmHg  

 

 Heart Rate  68 /min  

 

 Body Temperature  98.4 F  

 

 Respiratory Rate  16 /min  

 

 Height  59.5 inches  4'11.50"

 

 Weight  119.00 lb  

 

 BMI (Body Mass Index)  23.6 kg/m2  









 2015  BP Systolic  110 mmHg  









 BP Diastolic  64 mmHg  

 

 Heart Rate  68 /min  

 

 Body Temperature  95.7 F  

 

 Respiratory Rate  16 /min  

 

 Height  59.5 inches  4'11.50"

 

 Weight  120.00 lb  

 

 BMI (Body Mass Index)  23.8 kg/m2  









 2015  BP Systolic  118 mmHg  









 BP Diastolic  60 mmHg  

 

 Heart Rate  64 /min  

 

 Body Temperature  98.5 F  

 

 Respiratory Rate  16 /min  

 

 Height  59.5 inches  4'11.50"

 

 Weight  124.00 lb  

 

 BMI (Body Mass Index)  24.6 kg/m2  









 2015  BP Systolic  106 mmHg  









 BP Diastolic  56 mmHg  

 

 Heart Rate  60 /min  

 

 Body Temperature  98.1 F  

 

 Respiratory Rate  16 /min  

 

 Weight  124.12 lb  









 09/10/2014  BP Systolic  120 mmHg  









 BP Diastolic  70 mmHg  

 

 Heart Rate  76 /min  

 

 Body Temperature  98.7 F  

 

 Respiratory Rate  17 /min  

 

 Height  59.5 inches  4'11.50"

 

 Weight  117.00 lb  

 

 BMI (Body Mass Index)  23.2 kg/m2  









 2013  BP Systolic  90 mmHg  









 BP Diastolic  60 mmHg  

 

 Heart Rate  54 /min  

 

 Body Temperature  98.6 F  

 

 Respiratory Rate  16 /min  

 

 O2 % BldC Oximetry  97 %  

 

 Height  59.5 inches  4'11.50"

 

 Weight  112.00 lb  

 

 BMI (Body Mass Index)  22.2 kg/m2  

 

 Body Mass Index Percentile  62 %  

 

 Weight Percentile  25th  

 

 Height Percentile  3 %  









 2012  BP Systolic  98 mmHg  









 BP Diastolic  68 mmHg  

 

 Heart Rate  60 /min  

 

 Body Temperature  98.0 F  

 

 Height  59.5 inches  4'11.50"

 

 Weight  118.00 lb  

 

 BMI (Body Mass Index)  23.4 kg/m2  

 

 Body Mass Index Percentile  74 %  

 

 Weight Percentile  40th  

 

 Height Percentile  3 %  









 2011  BP Systolic  104 mmHg  









 BP Diastolic  60 mmHg  

 

 Heart Rate  60 /min  

 

 Body Temperature  97.1 F  

 

 Respiratory Rate  16 /min  

 

 Height  59.5 inches  4'11.50"

 

 Weight  111.00 lb  

 

 BMI (Body Mass Index)  22.0 kg/m2  

 

 Body Mass Index Percentile  68 %  

 

 Weight Percentile  34th  

 

 Height Percentile  3 %  

 

 Right Visual Acuity Distance  20/50  corrected

 

 Left Visual Acuity Distance  20/50  corrected









 2011  BP Systolic  100 mmHg  









 BP Diastolic  56 mmHg  

 

 Heart Rate  64 /min  

 

 Body Temperature  98.6 F  

 

 Height  59.5 inches  4'11.50"

 

 Weight  107.00 lb  

 

 BMI (Body Mass Index)  21.2 kg/m2  

 

 Body Mass Index Percentile  62 %  

 

 Weight Percentile  28th  

 

 Height Percentile  4 %  









 2009  BP Systolic  110 mmHg  









 BP Diastolic  70 mmHg  

 

 Heart Rate  84 /min  

 

 Body Temperature  101.5 F  

 

 Weight  96.00 lb  

 

 Weight Percentile  302007  BP Systolic  92 mmHg  









 BP Diastolic  50 mmHg  

 

 Heart Rate  76 /min  

 

 Body Temperature  99.2 F  

 

 Height  51.5 inches  4'3.50"

 

 Weight  80.00 lb  

 

 BMI (Body Mass Index)  21.2 kg/m2  

 

 Body Mass Index Percentile  88 %  

 

 Weight Percentile  34th  

 

 Height Percentile  5 %  

 

 Right Visual Acuity Distance  20/20  

 

 Left Visual Acuity Distance  20/20  









 2005  Heart Rate  88 /min  









 Body Temperature  97.7 F  

 

 Weight  62.00 lb  

 

 Weight Percentile  322004  Heart Rate  78 /min  









 Body Temperature  97.6 F  

 

 Weight  60.00 lb  

 

 Weight Percentile  312003  Heart Rate  92 /min  









 Body Temperature  98.8 F  

 

 Weight  55.00 lb  

 

 Weight Percentile  342001  Heart Rate  88 /min  









 Body Temperature  98.8 F  

 

 Height  41 inches  3'5"

 

 Weight  35.00 lb  

 

 BMI (Body Mass Index)  14.6 kg/m2  

 

 Weight Percentile  &lt;5th  

 

 Height Percentile  5 %  

 

 Right Visual Acuity Distance  20/20  

 

 Left Visual Acuity Distance  /20/20  









 2001  Body Temperature  98.1 F  









 Weight  34.00 lb  









 2000  BP Systolic  98 mmHg  









 BP Diastolic  54 mmHg  

 

 Heart Rate  120 /min  

 

 Height  40.5 inches  3'4.50"

 

 Weight  34.00 lb  

 

 BMI (Body Mass Index)  14.9 kg/m2  

 

 Height Percentile  9 %  

 

 Right Visual Acuity Distance  20/25  

 

 Left Visual Acuity Distance  20/25  









 1999  BP Systolic  98 mmHg  









 BP Diastolic  54 mmHg  

 

 Heart Rate  88 /min  

 

 Height  38 inches  3'2"

 

 Weight  30.00 lb  

 

 BMI (Body Mass Index)  14.6 kg/m2  

 

 Height Percentile  12 %  









 1998  Body Temperature  97.8 F  









 Weight  27.00 lb  

 

 Weight Percentile  1998  Body Temperature  97.6 F  









 Height  35 inches  2'11"

 

 Weight  27.00 lb  

 

 Weight Percentile  1998  Body Temperature  96.9 F  









 Weight  27.00 lb  

 

 Weight Percentile  1997  Body Temperature  96.2 F  









 Respiratory Rate  2020 /min  

 

 Height  32.50 inches  2'8.50"

 

 Weight  23.00 lb  

 

 Weight Percentile  &lt;5th  







Results







 Test  Date  Test  Result  H/L  Range  Note

 

 Laboratory test  2017  Urine Culture And  SEE RESULT BELOW      1, 2



 finding    Sensitivities        

 

 Laboratory test  2017  Poc Pregnancy, Urine  Negative    Negative  3



 finding            

 

 Poc Urinalysis  2017  Poc Glucose, Urine  Negative    Negative  









 Poc Bilirubin, Urine  Negative    Negative  

 

 Poc Ketone, Urine  Negative    Negative  

 

 Poc Specific Gravity, Urine  &gt;=1.030    1.010-1.030  

 

 Poc Blood, Urine  2+    Negative  

 

 Poc pH, Urine  6.0    5-9  

 

 Poc Protein, Urine  2+    Negative  

 

 Poc Urobilinogen, Urine  0.2    Negative  

 

 Poc Nitrite, Urine  Negative    Negative  

 

 Poc Leukocytes, Urine  1+    Negative  

 

 Poc Color, Urine  Yellow      

 

 Poc Clarity, Urine  Cloudy      4









 Laboratory test  2017  Urine Culture And  SEE RESULT BELOW      5



 finding    Sensitivities        

 

 Iron And Tibc  2016  Iron Bind.Cap.(Tibc)  358 g/dL    250-450  6









 Uibc  283 g/dL    131-425  6

 

 Iron, Serum  75 g/dL      6

 

 Iron Saturation  21 %    15-55  6









 Laboratory test finding  2016  Ferritin  16 ng/mL    6-115  7









 Vitamin B-12  272 pg/mL    230-1050  

 

 Vitamin D25  14  Low    8









 Laboratory test finding  2016  Sedimentation Rate  4mm      

 

 Hemoglobinopahty Profile  2016  Hgb Solubility  Negative    Negative  6









 Hgb F  11.3 %  High  0.0-2.0  6, 9

 

 Hgb A  83.5 %  Low  94.0-98.0  6

 

 Hgb S  0.0 %    0.0  6

 

 Hgb C  0.0 %    0.0  6

 

 Hgb A2  5.2 %  High  0.7-3.1  6

 

 Hgb Variant  TNP      6

 

 Interpretation  See Comment:      6, 10









 Comprehensive Metabolic Prof  03/10/2016  Sodium  135 mEq/L    134-149  









 Potassium  4.3 mEq/L    3.6-5.5  

 

 Chloride  101 mEq/L      

 

 Carbon Dioxide  26 mEq/L    21-32  

 

 Glucose  91 mg/dL      

 

 BUN  8 mg/dL    6-26  

 

 Creatinine  0.7 mg/dL    0.6-1.4  

 

 BUN/Creat Ratio  11.4 CALC    8.0-36.0  

 

 Calcium  9.8 mg/dL    8.6-10.2  

 

 Total Protein  7.2 g/dL    6.4-8.3  

 

 Albumin  4.3 g/dL    3.8-5.5  

 

 Globulin  2.9 g/dL    2.0-4.8  

 

 A/G Ratio  1.5 CALC    0.6-2.3  

 

 Alk. Phosphatase  29 U/L  Low    11

 

 Alt (SGPT)  9 U/L    7-35  

 

 Ast (Sgot)  18 U/L    5-34  

 

 Total Bilirubin  0.5 mg/dL    0.2-1.3  

 

 GFR Non-African American  &gt;60 ml/min/1.73m^    &gt;=60  

 

 GFR   &gt;60 ml/min/1.73m^    &gt;=60  









 Lipid Profile  03/10/2016  Cholesterol  177 mg/dL    120-200  









 Triglycerides  119 mg/dL      

 

 HDL Cholesterol  76 mg/dL    30-85  

 

 LDL (Calculated)  77 CALC    0-129  

 

 VLDL Cholesterol  24 mg/dL    0-50  

 

 HDL Risk Factor  2.3 CALC    0.0-4.4  









 Complete Blood Count  03/10/2016  WBC  6.0 x10^3/UL    3.6-9.6  









 RBC  5.33 x10^6/UL    3.90-5.70  

 

 HGB  12.1 g/dL    12.1-17.2  

 

 HCT  38 %    36-50  

 

 MCV  70.0 fL  Low  82.2-97.4  12

 

 MCH  22.7 pg  Low  27.6-33.3  13

 

 MCHC  32.1 g/dL  Low  33.0-35.5  14

 

 RDW  19.4 %  High  11.6-13.7  

 

 PLT  387 x10^3/UL    150-400  

 

 MPV  7.6 fL    7.4-10.4  

 

 Gran #  4.5 x10^3/UL    1.5-7.2  

 

 Lymph#  1.3 x10^3/UL    0.7-4.9  

 

 Mono#  0.2 x10^3/UL    0.1-0.9  

 

 Gran %  73.9 %    42.2-75.2  

 

 Lymph %  22.2 %    20.5-51.1  

 

 Mono%  3.9 %    1.7-9.3  









 Laboratory test finding  03/10/2016  TSH  1.43 mIU/L    0.50-6.00  15









 Free T4  1.01 ng/dL    0.75-1.54  









 Laboratory test finding  2015  Quickstrep  negative    Negative  









 Throat - Beta Strep Fma  negative@48hrs      









 Laboratory test finding  2013  Quickstrep  NEGATIVE    Negative  









 Throat - Beta Strep Fma  [id number]      









 Ua - Micro (a)  2007  Appearance  CLEAR      









 Color  YELLOW      

 

 Glucose  NEG      

 

 Bilirubin  NEG      

 

 Ketones  NEG      

 

 SP Grav  &gt;=1.030      

 

 Blood  TRACE-INTACT      

 

 PH  5.0      

 

 Protein, Random Urine  NEG      

 

 Urobil  0.2      

 

 Nitrite  NEG      

 

 Leukocytes (Fma/CMC/Centrex)  NEG      

 

 Hyaline  - /Lpf      

 

 Granular  - /Lpf      

 

 WBC (Fma,Centrex)  -      

 

 RBC, Fluid  2-4      

 

 Mucus  SM AMT /Lpf      

 

 Epith  - /Lpf      

 

 Bacteria  RARE /Hpf      

 

 Amorphous  - /Lpf      

 

 Crystals, Urine (Fma/CMC/CTX)  - /Lpf      

 

 Misc  -      









 Ua - Micro (a New)  2001  Appearance  CLEAR LT YELLOW      









 Glucose  NEGATIVE      

 

 Bilirubin  NEGATIVE      

 

 Ketones  NEGATIVE      

 

 SP Grav  &gt;=1.030      

 

 Blood  TRACE-INTACT      

 

 PH  5.0      

 

 Protein  NEGATIVE      

 

 Urobil  0.2      

 

 Nitrite  NEGATIVE      

 

 Leukocytes  NEGATIVE      

 

 Hyaline  - /Lpf      

 

 Granular  - /Lpf      

 

 WBC'S  -      

 

 RBC'S  0-1      

 

 Mucus  - /Lpf      

 

 Epith  RARE      

 

 Bacteria  RARE      

 

 Amorphous  SLT /Lpf      

 

 Crystals  - /Lpf      

 

 Comments  -      









 Laboratory test finding  2001  Hematocrit  35 %  Low  36.1 - 50.3  

 

 Ua - Micro ([facility])  2001  Appearance  CLEAR LT YELLOW      









 Glucose  NEGATIVE      

 

 Bilirubin  NEGATIVE      

 

 Ketones  NEGATIVE      

 

 SP Grav  1.020      

 

 Blood  TRACE-INTACT      

 

 PH  7.0      

 

 Protein  NEGATIVE      

 

 Urobil  1.0      

 

 Nitrite  NEGATIVE      

 

 Leukocytes  TRACE      

 

 Hyaline  - /Lpf      

 

 Granular  - /Lpf      

 

 WBC'S  0-1      

 

 RBC'S  1-5      

 

 Mucus  - /Lpf      

 

 Epith  -      

 

 Bacteria  1+      

 

 Amorphous  - /Lpf      

 

 Crystals  - /Lpf      

 

 Comments  SEE DETAILS      16









 Ua - Non Micro (a New)  2000  Appearance  CLEAR/LT YELLOW      









 Glucose  NEG      

 

 Bilirubin  NEG      

 

 Ketones  NEG      

 

 SP Grav  1.015      

 

 Blood  NEG      

 

 PH  7.0      

 

 Protein  NEG      

 

 Urobil  0.2      

 

 Nitrite  NEG      

 

 Leukocytes  NEG      









 Ua - Non Micro ([facility])  1999  Appearance  LT. YEL CLEAR      









 SP Grav  1.010      

 

 Esterase  -      

 

 Nitrite  -      

 

 pH  7.5      

 

 Protein  -      

 

 Glucose  -      

 

 Ketones  -      

 

 Urobil  -      

 

 Bilirubin  -      

 

 Blood  -      









 1  [id number]

 

 2  SEE RESULT BELOW



   -----------------------------------------------------------------------------
---------------



   Name:  CATHERINE ARREOLA         : 1995    Attend Dr: Magalys Coffey MD



   Acct:  [id number]  Unit: [id number]  AGE: 21            Location:  [facility]



   Re17                        SEX: F             Status:    DEP ER



   -----------------------------------------------------------------------------
---------------



   



   SPEC: [id number]         DILCIA:       SUBM DR: Magalys Coffey MD



   REQ:  [id number]              RECD:   



   STATUS: MCKAY HEAD DR: Zain Ramírez MD



   _



   SOURCE: URINE          SPDESC:



   ORDERED:  Urine Culture



   COMMENTS: [id number]



   



   -----------------------------------------------------------------------------
---------------



   Procedure                         Result                         Reported   
        Site



   -----------------------------------------------------------------------------
---------------



   Urine Culture  Final                                             17-
0728      ML



   



   Organism 1                     ESCHERICHIA COLI



   Compton Count                &gt;100,000 (Many) CFU/ML



   



   1. ESCHERICHIA COLI



   M.I.C.      RX



   --------- ------



   Ampicillin                     &gt;=32        R



   Cefazolin                      &lt;=4         S



   Cefepime                       &lt;=1         S



   Ceftriaxone                    &lt;=1         S



   Ciprofloxacin                  &lt;=0.25      S



   Gentamicin                     &lt;=1         S



   Levofloxacin                   &lt;=0.12      S



   Meropenem                      &lt;=0.25      S



   Nitrofurantoin                 &lt;=16        S



   Tetracycline                   &lt;=1         S



   Pipercillin/Tazobactam         &lt;=4         S



   Trimethoprim/Sulfamethoxazole  &lt;=20        S



   Amoxicillin/Clavulanic Acid    16          I



   Aztreonam                      &lt;=1         S



   



   



   Contact the Microbiology Department for any additional



   antibiotic reporting.



   



   -----------------------------------------------------------------------------
---------------



   * ML - MAIN LAB ([facility])



   .



   



   



   ** END OF REPORT **



   



   * ML=Testing performed at Main Lab



   DEPARTMENT OF PATHOLOGY,  [address] [place] [address]



   Phone # [phone]      Fax #[phone]



   Refugio Ram M.D. Director     [facility] # [id number]

 

 3  : [id number]



   If pregnancy is still suspected, please repeat test after



   48 to 72 hours.

 

 4  : [id number]

 

 5  SEE RESULT BELOW



   -----------------------------------------------------------------------------
---------------



   Name:  CATHERINE ARREOLA         : 1995    Attend Dr: Morgan Reyes MD



   Acct:  [id number]  Unit: [id number]  AGE: 21            Location:  [facility]



   Re17                        SEX: F             Status:    DEP ER



   -----------------------------------------------------------------------------
---------------



   



   SPEC: [id number]         DILCIA:       SUBM DR: Harjit GARCIA



   REQ:  [id number]              RECD:   17-



   STATUS: MCKAY HEAD DR: Morgan Ramírez MD



   _



   SOURCE: URINE          SPDESC:



   ORDERED:  Urine Culture



   



   -----------------------------------------------------------------------------
---------------



   Procedure                         Result                         Reported   
        Site



   -----------------------------------------------------------------------------
---------------



   Urine Culture  Final                                             17-
1507      ML



   



   Organism 1                     STREP GROUP B



   Compton Count                10-25,000 (Moderate) CFU/ML



   Organism 2                     ESCHERICHIA COLI



   Compton Count                25-50,000 (Moderate) CFU/ML



   



   Susceptibility testing of penicillins and other B-lactams



   approved by FDA for treatment of Streptococcus pyogenes



   (Group A Strep) and Streptococcus agalactiae (Group B Strep)



   is not necessary for clinical purposes and need not be done



   routinely, since as with vancomycin, resistant strains have



   not been recognized.  (CLSI [id number];p.66)



   Positive isolates will be saved for one week.  Please call



   the Microbiology Laboratory if further susceptibility



   testing is needed.



   



   2. ESCHERICHIA COLI



   M.I.C.      RX



   --------- ------



   Ampicillin                     &gt;=32        R



   Cefazolin                      16          I



   Cefepime                       &lt;=1         S



   Ceftriaxone                    &lt;=1         S



   Ciprofloxacin                  &lt;=0.25      S



   Gentamicin                     &lt;=1         S



   Levofloxacin                   &lt;=0.12      S



   Meropenem                      &lt;=0.25      S



   Nitrofurantoin                 &lt;=16        S



   



   ** CONTINUED ON NEXT PAGE **



   



   * ML=Testing performed at Main Lab



   DEPARTMENT OF PATHOLOGY,  [address] [place] [address]



   Phone # [phone]      Fax #[phone]



   Refugio Ram M.D. Director     [facility] # [id number]



   



   



   



   -----------------------------------------------------------------------------
---------------



   Patient: CATHERINE ARREOLA          [id number]     (Continued)



   -----------------------------------------------------------------------------
---------------



   



   



   Specimen: [id number]    Collected: 17-  Received: 17-
    (Continued)



   



   -----------------------------------------------------------------------------
---------------



   Procedure                         Result                         Reported   
        Site



   -----------------------------------------------------------------------------
---------------



   Urine Culture  Final   (continued)                               17-
150



   2. ESCHERICHIA COLI  (continued)



   M.I.C.      RX



   --------- ------



   Tetracycline                   &lt;=1         S



   Pipercillin/Tazobactam         &lt;=4         S



   Trimethoprim/Sulfamethoxazole  &lt;=20        S



   Amoxicillin/Clavulanic Acid    16          I



   Aztreonam                      &lt;=1         S



   



   



   Contact the Microbiology Department for any additional



   antibiotic reporting.



   



   -----------------------------------------------------------------------------
---------------



   * ML - MAIN LAB ([facility])



   .



   



   



   



   



   



   



   



   



   



   



   



   



   



   



   



   



   



   



   



   



   



   



   ** END OF REPORT **



   



   * ML=Testing performed at Main Lab



   DEPARTMENT OF PATHOLOGY,  [address] [place] [address]



   Phone # [phone]      Fax #[phone]



   Refugio Ram M.D. Director     [facility] # [id number]

 

 6  1 sst

 

 7  FASTING

 

 8  RESULTS VERIFIED BY REPEAT ANALYSIS

 

 9  Hemoglobin pattern and concentrations reveal an elevation of Hgb F



   which may indicate the presence of a hereditary persistence gene.



   However, elevations of Hgb F have also been reported to occur with



   certain anemias, pregnancy, porphyrias, and some malignancies.

 

 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



   Thalassemia minor. Suggest hematologic and clinical correlation.

 

 11  RESULTS VERIFIED BY REPEAT ANALYSIS

 

 12  RESULTS VERIFIED BY REPEAT ANALYSIS

 

 13  RESULTS VERIFIED BY REPEAT ANALYSIS

 

 14  RESULTS VERIFIED BY REPEAT ANALYSIS

 

 15  FASTING

 

 16  REPEAT UA SCHEDULED FOR 01.







Procedures







 Date  CPT Code  Description  Status

 

 2013  72359  Pulse Oximetry  Completed

 

 2011  24728  Vision Test- screening test of visual acuity,  Completed



     quantitative, bila  







Encounters







 Type  Date  Location  Provider  CPT E/M  Dx

 

 Office Visit  10/06/2016  3:15p  Main Office  Asuncion SethNayeli  [address]  
R11.0









 R42









 Office Visit  2016  2:00p  Main Office  Paz Pizarro, [facility]  [address]  
F43.21









 G47.01

 

 N92.0

 

 D64.9









 Office Visit  2016  8:00a  Main Office  Paz Pizarro [facility]  [address]  
F43.21









 G47.01

 

 G47.26

 

 N92.0

 

 D64.9









 Office Visit  2016  9:45a  Main Office  Paz Gerardobhart [facility]  [address]  
F43.21

 

 Office Visit  03/10/2016 10:00a  [facility]  Adore Talbert M.D.  
[address]  Z00.01









 F43.21

 

 R23.8









 Office Visit  2015  2:00p  [facility]  Paz Gerardoakila [facility]  
[address]  462

 

 Office Visit  2015  2:00p  [facility]  Paz Gerardobhart [facility]  
[address]  789.09

 

 Office Visit  2015  1:00p  [facility] Office  Mana Younger [place]  [address]  726.31

 

 Office Visit  09/10/2014  1:30p  [facility] Office  Mana Younger NP  [address]7499  461.8

 

 Office Visit  2013  9:00a  [facility]  Asuncion SethNayeli  
[address]  465.9

 

 Office Visit  2012 10:20a  [facility]  Zain Ramírez M.D.  
[address]397  V70.0

 

 Office Visit  2011  3:00p  [facility]  Adore Talbert M.D.  
96563  V70.0

 

 Office Visit  2011 11:40a  [facility]  Adore Talbert M.D.  
[address]38  307.20









 V70.0









 Office Visit  2009  2:00p  [facility] Office  Aurelio Duvall M.D.  [address]  
465.9









 461.9









 Office Visit  2007  3:30p  [facility]  Erik Shahid-C  
[address]  V06.5









 V05.3

 

 599.7

 

 V20.2









 Office Visit  2005 11:00a  [facility] Office  Shailesh Diaz M.D.  [address]  
465.9

 

 Office Visit  2004 12:00p  [facility] Office  Aurelio Duvall M.D.  [address]  
782.1

 

 Office Visit  2003  2:00p  [facility] Office  Grisel Jackson [facility]  [address]  490

 

 Office Visit  2001  2:20p  [facility]  Refugio Ascencio M.D.  
[address]  

 

 Office Visit  2001 12:40p  [facility] Office  Refugio Ascencio M.D.  [address]  







Plan of Care

2018 - Yasmine Reyes, NPG44.209 Tension-type headache, unspecified, 
not intractableNew Medication:Cyclobenzaprine HCL 10 mgSumatriptan Succinate 50 
mgComments:Plan A: take a headache preparation that has aspirin, acetaminophen, 
and caffeine at the first sign of pain; use the muscle relaxer shortly 
thereafter. Do this for 3 days -- if you stop getting headaches, simply use 
that when needed. If you continue to have headaches, go to . . .Plan B: take 
one sumatriptan by mouth at first sign of headache, not more than 3 days in a 
row (and not more than 4 doses in a week).If you stop having headaches, simply 
follow up only if needed. If you continue to have headaches, please come back 
for a recheck and to consider neurological consultation.

## 2019-02-21 NOTE — ED
Abdominal Pain/Female





- HPI Summary


HPI Summary: 





A 22 y/o female presents to [facility] with a chief  complaint of abdominal pain 

since 02:00 02/21/19. She reports that her abdominal pain is more left sided. 

At triage she rated her pain as a 9/10 in severity. She reports that she had a 

gallbladder ultrasound one year PTA that was negative for stones. She denies 

any SHx. She reports that she last had a bowel movement on 02/20/19. Per triage 

note the patient denies N/V/D or fever but she also c/o some back pain.





- History of Current Complaint


Chief Complaint: EDAbdPain


Stated Complaint: ABD/BACK PAIN PAIN


Time Seen by Provider: 02/21/19 05:32


Hx Obtained From: Patient


Onset/Duration: Sudden Onset, Still Present


Timing: Constant


Severity Initially: Severe


Severity Currently: Severe


Pain Intensity: 9


Pain Scale Used: 0-10 Numeric


Location: Diffuse - more left sided


Radiates: Yes


Radiates to: Back


Character: Not Applicable


Aggravating Factor(s): Nothing


Alleviating Factor(s): Nothing


Associated Signs and Symptoms: Positive: Back Pain.  Negative: Fever, Nausea, 

Vomiting, Diarrhea


Allergies/Adverse Reactions: 


 Allergies











Allergy/AdvReac Type Severity Reaction Status Date / Time


 


No Known Allergies Allergy   Verified 02/21/19 05:18














PMH/Surg Hx/FS Hx/Imm Hx


Endocrine/Hematology History: 


   Denies: Hx Diabetes, Hx Thyroid Disease


Cardiovascular History: 


   Denies: Hx Hypertension


Respiratory History: 


   Denies: Hx Asthma, Hx Chronic Obstructive Pulmonary Disease (COPD)


GI History: 


   Denies: Hx Ulcer





- Surgical History


Surgery Procedure, Year, and Place: none reported.


Infectious Disease History: No


Infectious Disease History: 


   Denies: Hx Clostridium Difficile, Hx Hepatitis, Hx Human Immunodeficiency 

Virus (HIV), Hx of Known/Suspected MRSA, Hx Shingles, Hx Tuberculosis, Hx Known/

Suspected VRE, Hx Known/Suspected VRSA, History Other Infectious Disease, 

Traveled Outside the [place] in Last 30 Days





- Family History


Known Family History: 


   Negative: Hypertension, Renal Disease





- Social History


Alcohol Use: Occasionally


Alcohol Amount: couple times every 2 weeks


Substance Use Type: Reports: None


Smoking Status (MU): Never Smoked Tobacco





Review of Systems


Negative: Fever


Positive: Abdominal Pain.  Negative: Vomiting, Diarrhea, Nausea


All Other Systems Reviewed And Are Negative: Yes





Physical Exam





- Summary


Physical Exam Summary: 





VITAL SIGNS: Reviewed.


GENERAL: Patient is a well-developed and nourished  FEMALE who is lying 

comfortable in the stretcher. Patient is not in any acute respiratory distress.


HEAD AND FACE: No signs of trauma. No ecchymosis, hematomas or skull 

depressions. No sinus tenderness.


EYES: PERRLA, EOMI x 2, No injected conjunctiva, no nystagmus.


EARS: Hearing grossly intact. Ear canals and tympanic membranes are within 

normal limits.


MOUTH: Oropharynx within normal limits.


NECK: Supple, trachea is midline, no adenopathy, no JVD, no carotid bruit, no c-

spine tenderness, neck with full ROM.


CHEST: Symmetric, no tenderness at palpation


LUNGS: Clear to auscultation bilaterally. No wheezing or crackles.


CVS: Regular rate and rhythm, S1 and S2 present, no murmurs or gallops 

appreciated.


ABDOMEN: Diffuse tenderness over the left side of the abdomen. No signs of 

distention. No rebound no guarding, and no masses palpated. Bowel sounds are 

normal.


EXTREMITIES: FROM in all major joints, no edema, no cyanosis or clubbing.


NEURO: Alert and oriented x 3. No acute neurological deficits. Speech is normal 

and follows commands.


SKIN: Dry and warm


Triage Information Reviewed: Yes


Vital Signs On Initial Exam: 


 Initial Vitals











Temp Pulse Resp BP Pulse Ox


 


 98.1 F   78   20   133/76   100 


 


 02/21/19 05:14  02/21/19 05:14  02/21/19 05:14  02/21/19 05:14  02/21/19 05:14











Vital Signs Reviewed: Yes





Diagnostics





- Vital Signs


 Vital Signs











  Temp Pulse Resp BP Pulse Ox


 


 02/21/19 05:14  98.1 F  78  20  133/76  100














- Laboratory


Lab Statement: Any lab studies that have been ordered have been reviewed, and 

results considered in the medical decision making process.





- Radiology


  ** abdomen x-ray


Radiology Interpretation Completed By: ED Physician


Summary of Radiographic Findings: Increased colonic stool, consistent with 

constipation.  Pending official imaging report.





Re-Evaluation





- Re-Evaluation


  ** First Eval


Re-Evaluation Time: 06:22


Change: Improved


Comment: Patient is pain free, no tenderness at all.





Abdominal Pain Fem Course/Dx





- Course


Course Of Treatment: A 22 y/o female presents to [facility] with a chief  complaint 

of abdominal pain since 02:00 02/21/19. She reports that her abdominal pain is 

more left sided. At triage she rated her pain as a 9/10 in severity. She 

reports that she had a gallbladder ultrasound one year PTA that was negative 

for stones. She denies any SHx. She reports that she last had a bowel movement 

on 02/20/19. Per triage note the patient denies N/V/D or fever but she also c/o 

some back pain. The physical exam revealed diffuse tenderness over the left 

side of her abdomen. In the ED course the patient was given 30mg Toradol IM. 

Her abdomen x-ray revealed increased colonic stool, consistent with 

constipation. Urine obtained. Urine blood 2+. Ur Squamous Epith Cells present. 

Amorphous crystals present. Upon re-eval the patient is pain free and has no 

tenderness at all. She will be discharged. Return precautions given. She is 

agreeable with this plan.





- Diagnoses


Provider Diagnoses: 


 Abdominal pain, Constipation








Discharge





- Sign-Out/Discharge


Documenting (check all that apply): Patient Departure - DC


Patient Received Moderate/Deep Sedation with Procedure: No





- Discharge Plan


Condition: Stable


Disposition: HOME


Patient Education Materials:  Constipation (DC), Abdominal Pain (ED)


Referrals: 


Zain Ramírez MD [Primary Care Provider] -  (1-2 days)


Additional Instructions: 


RETURN TO THE EMERGENCY DEPARTMENT FOR CHANGING OR WORSENING SYMPTOMS. FOLLOW 

UP WITH PCP IN 1-2 DAYS.





- Attestation Statements


Document Initiated by Perla: Yes


Documenting Scribe: Corbin Beebe


Provider For Whom Perla is Documenting (Include Credential): Jose Ramon Palomino MD


Scribe Attestation: 


ICorbin, scribed for Jose Ramon Palomino MD on 02/21/19 at 0629. 


Status of Scribe Document: Ready